# Patient Record
Sex: FEMALE | Race: WHITE | NOT HISPANIC OR LATINO | URBAN - METROPOLITAN AREA
[De-identification: names, ages, dates, MRNs, and addresses within clinical notes are randomized per-mention and may not be internally consistent; named-entity substitution may affect disease eponyms.]

---

## 2024-10-11 ENCOUNTER — EMERGENCY (EMERGENCY)
Facility: HOSPITAL | Age: 49
LOS: 1 days | Discharge: HOME | End: 2024-10-11
Attending: STUDENT IN AN ORGANIZED HEALTH CARE EDUCATION/TRAINING PROGRAM | Admitting: STUDENT IN AN ORGANIZED HEALTH CARE EDUCATION/TRAINING PROGRAM
Payer: COMMERCIAL

## 2024-10-11 VITALS
RESPIRATION RATE: 18 BRPM | HEART RATE: 79 BPM | SYSTOLIC BLOOD PRESSURE: 118 MMHG | DIASTOLIC BLOOD PRESSURE: 81 MMHG | TEMPERATURE: 97 F | OXYGEN SATURATION: 96 %

## 2024-10-11 VITALS
OXYGEN SATURATION: 99 % | WEIGHT: 259.93 LBS | TEMPERATURE: 98 F | HEART RATE: 90 BPM | HEIGHT: 67 IN | DIASTOLIC BLOOD PRESSURE: 96 MMHG | RESPIRATION RATE: 17 BRPM | SYSTOLIC BLOOD PRESSURE: 148 MMHG

## 2024-10-11 PROCEDURE — 99284 EMERGENCY DEPT VISIT MOD MDM: CPT | Mod: 25

## 2024-10-11 PROCEDURE — 70450 CT HEAD/BRAIN W/O DYE: CPT | Mod: 26,MC

## 2024-10-11 PROCEDURE — 99284 EMERGENCY DEPT VISIT MOD MDM: CPT

## 2024-10-11 PROCEDURE — 70450 CT HEAD/BRAIN W/O DYE: CPT | Mod: MC

## 2024-10-11 NOTE — ED ADULT NURSE NOTE - OBJECTIVE STATEMENT
Pt received in bed alert and oriented and resting in bed with the dizziness and head tenderness from a fall while and cruise. Pt seen and evaluated. Pt s/p ct scan. pt stable

## 2024-10-11 NOTE — ED PROVIDER NOTE - NSFOLLOWUPINSTRUCTIONS_ED_ALL_ED_FT
Concussion, Adult    A concussion is a brain injury from a hard, direct hit (trauma) to the head or body. This direct hit causes the brain to shake quickly back and forth inside the skull. This can damage brain cells and cause chemical changes in the brain. A concussion may also be known as a mild traumatic brain injury (TBI).    Concussions are usually not life-threatening, but the effects of a concussion can be serious. If you have a concussion, you should be very careful to avoid having a second concussion.    What are the causes?  This condition is caused by:  •A direct hit to your head, such as:  •Running into another player during a game.  •Being hit in a fight.  •Hitting your head on a hard surface.  •Sudden movement of your body that causes your brain to move back and forth inside the skull, such as in a car crash.    What are the signs or symptoms?    The signs of a concussion can be hard to notice. Early on, they may be missed by you, family members, and health care providers. You may look fine on the outside but may act or feel differently.    Every head injury is different. Symptoms are usually temporary but may last for days, weeks, or even months. Some symptoms appear right away, but other symptoms may not show up for hours or days. If your symptoms last longer than normal, you may have post-concussion syndrome.    Physical symptoms   •Headaches.  •Dizziness and problems with coordination or balance.  •Sensitivity to light or noise.  •Nausea or vomiting.  •Tiredness (fatigue).  •Vision or hearing problems.  •Changes in eating or sleeping patterns.  •Seizure.    Mental and emotional symptoms   •Irritability or mood changes.  •Memory problems.  •Trouble concentrating, organizing, or making decisions.  •Slowness in thinking, acting or reacting, speaking, or reading.  •Anxiety or depression.    How is this diagnosed?    This condition is diagnosed based on:  •Your symptoms.  •A description of your injury.    You may also have tests, including:  •Imaging tests, such as a CT scan or an MRI.  •Neuropsychological tests. These measure your thinking, understanding, learning, and remembering abilities.    How is this treated?    Treatment for this condition includes:•Stopping sports or activity if you are injured. If you hit your head or show signs of concussion:   •Do not return to sports or activities the same day.   •Get checked by a health care provider before you return to your activities.  •Physical and mental rest and careful observation, usually at home. Gradually return to your normal activities.  •Medicines to help with symptoms such as headaches, nausea, or difficulty sleeping.  •Avoid taking opioid pain medicine while recovering from a concussion.  •Avoiding alcohol and drugs. These may slow your recovery and can put you at risk of further injury.  •Referral to a concussion clinic or rehabilitation center.    Recovery from a concussion can take time. How fast you recover depends on many factors. Return to activities only when:  •Your symptoms are completely gone.  •Your health care provider says that it is safe.    Follow these instructions at home:    Activity   •Limit activities that require a lot of thought or concentration, such as:  •Doing homework or job-related work.  •Watching TV.  •Working on the computer or phone.  •Playing memory games and puzzles.  •Rest. Rest helps your brain heal. Make sure you:  •Get plenty of sleep. Most adults should get 7–9 hours of sleep each night.  •Rest during the day. Take naps or rest breaks when you feel tired.  •Avoid physical activity like exercise until your health care provider says it is safe. Stop any activity that worsens symptoms.  • Do not do high-risk activities that could cause a second concussion, such as riding a bike or playing sports.  •Ask your health care provider when you can return to your normal activities, such as school, work, athletics, and driving. Your ability to react may be slower after a brain injury. Never do these activities if you are dizzy. Your health care provider will likely give you a plan for gradually returning to activities.    General instructions   A bottle of beer, a glass of wine, and a glass of hard liquor with a "do not drink" sign over them.    •Take over-the-counter and prescription medicines only as told by your health care provider. Some medicines, such as blood thinners (anticoagulants) and aspirin, may increase the risk for complications, such as bleeding.  • Do not drink alcohol until your health care provider says you can.  •Watch your symptoms and tell others around you to do the same. Complications sometimes occur after a concussion. Older adults with a brain injury may have a higher risk of serious complications.  •Tell your , teachers, school nurse, school counselor, , or  about your injury, symptoms, and restrictions.  •Keep all follow-up visits as told by your health care provider. This is important.    How is this prevented?  Avoiding another brain injury is very important. In rare cases, another injury can lead to permanent brain damage, brain swelling, or death. The risk of this is greatest during the first 7–10 days after a head injury. Avoid injuries by:  •Stopping activities that could lead to a second concussion, such as contact or recreational sports, until your health care provider says it is okay.  •Taking these actions once you have returned to sports or activities:  •Avoiding plays or moves that can cause you to crash into another person. This is how most concussions occur.  •Following the rules and being respectful of other players. Do not engage in violent or illegal plays.  •Getting regular exercise that includes strength and balance training.  •Wearing a properly fitting helmet during sports, biking, or other activities. Helmets can help protect you from serious skull and brain injuries, but they may not protect you from a concussion. Even when wearing a helmet, you should avoid being hit in the head.    Contact a health care provider if:  •Your symptoms do not improve.  •You have new symptoms.  •You have another injury.    Get help right away if:  •You have new or worsening physical symptoms, such as:  •A severe or worsening headache.  •Weakness or numbness in any part of your body, slurred speech, vision changes, or confusion.  •Your coordination gets worse.  •Vomiting repeatedly.  •You have a seizure.  •You have unusual behavior changes.  •You lose consciousness, are sleepier than normal, or are difficult to wake up.    These symptoms may represent a serious problem that is an emergency. Do not wait to see if the symptoms will go away. Get medical help right away. Call your local emergency services (911 in the [place] Do not drive yourself to the hospital.     Summary  •A concussion is a brain injury that results from a hard, direct hit (trauma) to your head or body.  •You may have imaging tests and neuropsychological tests to diagnose a concussion.  •Treatment for this condition includes physical and mental rest and careful observation.  •Ask your health care provider when you can return to your normal activities, such as school, work, athletics, and driving.   •Get help right away if you have a severe headache, weakness in any part of the body, seizures, behavior changes, changes in vision, or if you are confused or sleepier than normal.    This information is not intended to replace advice given to you by your health care provider. Make sure you discuss any questions you have with your health care provider.

## 2024-10-11 NOTE — ED PROVIDER NOTE - PATIENT PORTAL LINK FT
You can access the FollowMyHealth Patient Portal offered by [facility] by registering at the following website: [email or web address]. By joining [employer]’s FollowMyHealth portal, you will also be able to view your health information using other applications (apps) compatible with our system.

## 2024-10-11 NOTE — ED PROVIDER NOTE - OBJECTIVE STATEMENT
Patient with no significant past medical history is presenting with concern for head injury.  States 2 days ago while on a cruise she hit the top of her head very hard on a metal railing.  Since then has been having headaches.  Some slight dizziness.  No nausea or vomiting.  No weakness.  She is not on any anticoagulation.  With persistent symptoms, she came to ED for evaluation.

## 2024-10-11 NOTE — ED PROVIDER NOTE - PHYSICAL EXAMINATION
Constitutional: Awake, Alert, non-toxic. No acute distress.  HEAD: Normocephalic, atraumatic. No hematomas, contusions, lacerations, or signs of trauma seen on head/face/scalp.   EYES: EOM intact, conjunctiva and sclera are clear bilaterally.  ENT: External ears normal. No rhinorrhea, no tracheal deviation   NECK: Supple, non-tender  CARDIOVASCULAR: regular rate  RESPIRATORY: Normal respiratory effort; Speaking in full sentences. No accessory muscle use.   MSK:  no lower extremity edema, no deformities  SKIN: Warm, dry  NEURO: A&O x3. Sensory and motor functions are grossly intact. Speech is normal. CN 2-12 in tact. No facial droop. Normal finger to nose. Negative pronator drift. Normal heel to shin. Negative romberg sign. 5/5 strength in bilateral upper and lower extremities. Sensation in tact to light touch in bilateral upper and lower extremities.   PSYCH: Appearance and judgement seem appropriate for gender and age.

## 2024-10-11 NOTE — ED PROVIDER NOTE - PROGRESS NOTE DETAILS
Patient CT scan shows sinus thickening but no other acute intracranial abnormality.  Patient comfortable discharge.  Plans to follow-up as an outpatient.  Plan to discharge patient. Return to ED precautions were discussed with the patient/family. All questions were answered. Chao Tariq MD.

## 2024-10-11 NOTE — ED PROVIDER NOTE - CLINICAL SUMMARY MEDICAL DECISION MAKING FREE TEXT BOX
Patient with a benign physical exam, no external evidence of trauma.  Suspect possible concussion.  I had prolonged discussion at bedside in regards for the [place] CT head scoring system and that I do not feel that a CAT scan is clinically indicated or necessary at this time, however patient persistent and states she would not feel comfortable leaving without one.  I did discuss the risk and benefits of radiation as well as incidental findings.  She is understanding.  Will order CT scan of the head.